# Patient Record
Sex: FEMALE | Race: WHITE | NOT HISPANIC OR LATINO | Employment: UNEMPLOYED | URBAN - METROPOLITAN AREA
[De-identification: names, ages, dates, MRNs, and addresses within clinical notes are randomized per-mention and may not be internally consistent; named-entity substitution may affect disease eponyms.]

---
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
## 2021-06-21 ENCOUNTER — TELEPHONE (OUTPATIENT)
Dept: OBGYN CLINIC | Facility: CLINIC | Age: 21
End: 2021-06-21

## 2021-06-21 DIAGNOSIS — Z30.41 ENCOUNTER FOR SURVEILLANCE OF CONTRACEPTIVE PILLS: ICD-10-CM

## 2021-06-21 RX ORDER — NORETHINDRONE ACETATE AND ETHINYL ESTRADIOL 1MG-20(21)
1 KIT ORAL DAILY
Qty: 84 TABLET | Refills: 0 | Status: SHIPPED | OUTPATIENT
Start: 2021-06-21 | End: 2021-09-29

## 2021-06-21 NOTE — TELEPHONE ENCOUNTER
norethindrone-ethinyl estradiol (JUNEL FE 1/20) 1-20 MG-MCG per tablet [[id number]]     Needs a refill

## 2021-09-29 DIAGNOSIS — Z30.41 ENCOUNTER FOR SURVEILLANCE OF CONTRACEPTIVE PILLS: ICD-10-CM

## 2021-09-29 RX ORDER — NORETHINDRONE ACETATE AND ETHINYL ESTRADIOL AND FERROUS FUMARATE 1MG-20(21)
KIT ORAL
Qty: 84 TABLET | Refills: 0 | Status: SHIPPED | OUTPATIENT
Start: 2021-09-29 | End: 2021-10-06 | Stop reason: SDUPTHER

## 2021-10-06 ENCOUNTER — ANNUAL EXAM (OUTPATIENT)
Dept: OBGYN CLINIC | Facility: CLINIC | Age: 21
End: 2021-10-06
Payer: COMMERCIAL

## 2021-10-06 VITALS
BODY MASS INDEX: 40.37 KG/M2 | HEIGHT: 66 IN | DIASTOLIC BLOOD PRESSURE: 82 MMHG | SYSTOLIC BLOOD PRESSURE: 128 MMHG | WEIGHT: 251.2 LBS

## 2021-10-06 DIAGNOSIS — Z30.41 ENCOUNTER FOR SURVEILLANCE OF CONTRACEPTIVE PILLS: ICD-10-CM

## 2021-10-06 DIAGNOSIS — Z01.419 WOMEN'S ANNUAL ROUTINE GYNECOLOGICAL EXAMINATION: Primary | ICD-10-CM

## 2021-10-06 PROCEDURE — 99395 PREV VISIT EST AGE 18-39: CPT | Performed by: OBSTETRICS & GYNECOLOGY

## 2021-10-06 RX ORDER — NORETHINDRONE ACETATE AND ETHINYL ESTRADIOL 1MG-20(21)
1 KIT ORAL DAILY
Qty: 84 TABLET | Refills: 3 | Status: SHIPPED | OUTPATIENT
Start: 2021-10-06

## 2021-12-13 ENCOUNTER — OFFICE VISIT (OUTPATIENT)
Dept: URGENT CARE | Age: 21
End: 2021-12-13
Payer: COMMERCIAL

## 2021-12-13 VITALS
HEIGHT: 66 IN | HEART RATE: 105 BPM | WEIGHT: 240 LBS | BODY MASS INDEX: 38.57 KG/M2 | TEMPERATURE: 96.6 F | OXYGEN SATURATION: 96 %

## 2021-12-13 DIAGNOSIS — R05.9 COUGH: Primary | ICD-10-CM

## 2021-12-13 DIAGNOSIS — B34.9 VIRAL SYNDROME: ICD-10-CM

## 2021-12-13 PROCEDURE — 99213 OFFICE O/P EST LOW 20 MIN: CPT

## 2021-12-13 PROCEDURE — 0241U HB NFCT DS VIR RESP RNA 4 TRGT: CPT

## 2022-02-16 ENCOUNTER — APPOINTMENT (EMERGENCY)
Dept: RADIOLOGY | Facility: HOSPITAL | Age: 22
End: 2022-02-16
Payer: COMMERCIAL

## 2022-02-16 ENCOUNTER — HOSPITAL ENCOUNTER (EMERGENCY)
Facility: HOSPITAL | Age: 22
Discharge: HOME | End: 2022-02-16
Attending: EMERGENCY MEDICINE
Payer: COMMERCIAL

## 2022-02-16 VITALS
DIASTOLIC BLOOD PRESSURE: 70 MMHG | RESPIRATION RATE: 18 BRPM | OXYGEN SATURATION: 100 % | TEMPERATURE: 98.1 F | HEIGHT: 67 IN | SYSTOLIC BLOOD PRESSURE: 110 MMHG | HEART RATE: 100 BPM | WEIGHT: 240 LBS | BODY MASS INDEX: 37.67 KG/M2

## 2022-02-16 DIAGNOSIS — R07.9 CHEST PAIN: Primary | ICD-10-CM

## 2022-02-16 LAB
ALBUMIN SERPL BCP-MCNC: 4 G/DL (ref 3.4–4.8)
ALP SERPL-CCNC: 87.3 U/L (ref 35–140)
ALT SERPL W P-5'-P-CCNC: 16 U/L (ref 5–54)
ANION GAP SERPL CALCULATED.3IONS-SCNC: 8 MMOL/L (ref 4–13)
AST SERPL W P-5'-P-CCNC: 15 U/L (ref 15–41)
ATRIAL RATE: 93 BPM
BASOPHILS # BLD AUTO: 0.08 THOUSANDS/ΜL (ref 0–0.1)
BASOPHILS NFR BLD AUTO: 1 % (ref 0–1)
BILIRUB SERPL-MCNC: 0.31 MG/DL (ref 0.3–1.2)
BUN SERPL-MCNC: 8 MG/DL (ref 6–20)
CALCIUM SERPL-MCNC: 9.1 MG/DL (ref 8.4–10.2)
CARDIAC TROPONIN I PNL SERPL HS: <2 NG/L
CARDIAC TROPONIN I PNL SERPL HS: <2 NG/L
CHLORIDE SERPL-SCNC: 105 MMOL/L (ref 96–108)
CO2 SERPL-SCNC: 26 MMOL/L (ref 22–33)
CREAT SERPL-MCNC: 0.87 MG/DL (ref 0.4–1.1)
D DIMER PPP FEU-MCNC: 0.31 UG/ML FEU
EOSINOPHIL # BLD AUTO: 0.08 THOUSAND/ΜL (ref 0–0.61)
EOSINOPHIL NFR BLD AUTO: 1 % (ref 0–6)
ERYTHROCYTE [DISTWIDTH] IN BLOOD BY AUTOMATED COUNT: 12 % (ref 11.6–15.1)
EXT PREG TEST URINE: NEGATIVE
EXT. CONTROL ED NAV: NORMAL
GFR SERPL CREATININE-BSD FRML MDRD: 96 ML/MIN/1.73SQ M
GLUCOSE SERPL-MCNC: 152 MG/DL (ref 65–140)
HCT VFR BLD AUTO: 42.6 % (ref 34.8–46.1)
HGB BLD-MCNC: 13.8 G/DL (ref 11.5–15.4)
IMM GRANULOCYTES # BLD AUTO: 0.02 THOUSAND/UL (ref 0–0.2)
IMM GRANULOCYTES NFR BLD AUTO: 0 % (ref 0–2)
LYMPHOCYTES # BLD AUTO: 4.32 THOUSANDS/ΜL (ref 0.6–4.47)
LYMPHOCYTES NFR BLD AUTO: 35 % (ref 14–44)
MCH RBC QN AUTO: 26.9 PG (ref 26.8–34.3)
MCHC RBC AUTO-ENTMCNC: 32.4 G/DL (ref 31.4–37.4)
MCV RBC AUTO: 83 FL (ref 82–98)
MONOCYTES # BLD AUTO: 0.61 THOUSAND/ΜL (ref 0.17–1.22)
MONOCYTES NFR BLD AUTO: 5 % (ref 4–12)
NEUTROPHILS # BLD AUTO: 7.29 THOUSANDS/ΜL (ref 1.85–7.62)
NEUTS SEG NFR BLD AUTO: 58 % (ref 43–75)
NRBC BLD AUTO-RTO: 0 /100 WBCS
P AXIS: 47 DEGREES
PLATELET # BLD AUTO: 326 THOUSANDS/UL (ref 149–390)
PMV BLD AUTO: 9 FL (ref 8.9–12.7)
POTASSIUM SERPL-SCNC: 3.6 MMOL/L (ref 3.5–5)
PR INTERVAL: 144 MS
PROT SERPL-MCNC: 7.6 G/DL (ref 6.4–8.3)
QRS AXIS: 44 DEGREES
QRSD INTERVAL: 89 MS
QT INTERVAL: 333 MS
QTC INTERVAL: 415 MS
RBC # BLD AUTO: 5.13 MILLION/UL (ref 3.81–5.12)
SODIUM SERPL-SCNC: 139 MMOL/L (ref 133–145)
T WAVE AXIS: 20 DEGREES
VENTRICULAR RATE: 93 BPM
WBC # BLD AUTO: 12.4 THOUSAND/UL (ref 4.31–10.16)

## 2022-02-16 PROCEDURE — 80053 COMPREHEN METABOLIC PANEL: CPT | Performed by: EMERGENCY MEDICINE

## 2022-02-16 PROCEDURE — 71046 X-RAY EXAM CHEST 2 VIEWS: CPT

## 2022-02-16 PROCEDURE — 99285 EMERGENCY DEPT VISIT HI MDM: CPT

## 2022-02-16 PROCEDURE — 93005 ELECTROCARDIOGRAM TRACING: CPT

## 2022-02-16 PROCEDURE — 85379 FIBRIN DEGRADATION QUANT: CPT | Performed by: EMERGENCY MEDICINE

## 2022-02-16 PROCEDURE — 99285 EMERGENCY DEPT VISIT HI MDM: CPT | Performed by: EMERGENCY MEDICINE

## 2022-02-16 PROCEDURE — 85025 COMPLETE CBC W/AUTO DIFF WBC: CPT | Performed by: EMERGENCY MEDICINE

## 2022-02-16 PROCEDURE — 36415 COLL VENOUS BLD VENIPUNCTURE: CPT | Performed by: EMERGENCY MEDICINE

## 2022-02-16 PROCEDURE — 81025 URINE PREGNANCY TEST: CPT | Performed by: EMERGENCY MEDICINE

## 2022-02-16 PROCEDURE — 84484 ASSAY OF TROPONIN QUANT: CPT | Performed by: EMERGENCY MEDICINE

## 2022-02-16 PROCEDURE — 93010 ELECTROCARDIOGRAM REPORT: CPT | Performed by: INTERNAL MEDICINE

## 2022-02-16 RX ORDER — ACETAMINOPHEN 325 MG/1
975 TABLET ORAL ONCE
Status: COMPLETED | OUTPATIENT
Start: 2022-02-16 | End: 2022-02-16

## 2022-02-16 RX ORDER — NAPROXEN 250 MG/1
250 TABLET ORAL ONCE
Status: COMPLETED | OUTPATIENT
Start: 2022-02-16 | End: 2022-02-16

## 2022-02-16 RX ADMIN — NAPROXEN 250 MG: 250 TABLET ORAL at 23:26

## 2022-02-16 RX ADMIN — ACETAMINOPHEN 975 MG: 325 TABLET, FILM COATED ORAL at 23:26

## 2022-02-16 NOTE — Clinical Note
Aleshia Gabby was seen and treated in our emergency department on 2/16/2022  No restrictions            Diagnosis: Private    Gab Beard  may return to work on return date  She may return on this date: 02/18/2022         If you have any questions or concerns, please don't hesitate to call        Lilli Cooley, DO    ______________________________           _______________          _______________  Hospital Representative                              Date                                Time

## 2022-02-17 NOTE — ED PROVIDER NOTES
History  Chief Complaint   Patient presents with    Chest Pain     started just prior to arrival; Pain shoots down right arm      81-year-old female, history of PCOS, presents with 30 minutes of chest pain described as substernal chest pain that radiates to her right arm and her back, denies any recent fever, cough, shortness of breath, headache, dizziness, nausea, vomiting, abdominal pain, dysuria, hematuria, constipation or diarrhea, or other complaints  Patient reports the chest pain feels like pressure-like pain was sharp stabbing exacerbations, worse with movement, not worse with inspiration  Prior to Admission Medications   Prescriptions Last Dose Informant Patient Reported? Taking? Cholecalciferol (VITAMIN D PO) Not Taking at Unknown time  Yes No   Sig: Take by mouth   Patient not taking: Reported on 12/13/2021    METFORMIN HCL PO Not Taking at Unknown time  Yes No   Sig: Take by mouth   Patient not taking: Reported on 10/6/2021   UNKNOWN TO PATIENT Not Taking at Unknown time  Yes No   Sig: Birth control   Patient not taking: Reported on 10/6/2021   norethindrone-ethinyl estradiol (Junel FE 1/20) 1-20 MG-MCG per tablet 2/16/2022 at Unknown time  No Yes   Sig: Take 1 tablet by mouth daily      Facility-Administered Medications: None       Past Medical History:   Diagnosis Date    Hirsutism     face, chest, abdomen, upper inner thigh    Low vitamin D level     Polycystic ovary syndrome        History reviewed  No pertinent surgical history  Family History   Problem Relation Age of Onset    Hypertension Mother     Hyperlipidemia Mother     Diabetes Father     Hypertension Father     Hyperlipidemia Father     Kidney failure Father         dyalysis    Kidney cancer Maternal Grandmother      I have reviewed and agree with the history as documented      E-Cigarette/Vaping    E-Cigarette Use Never User      E-Cigarette/Vaping Substances    Nicotine No     THC No     CBD No     Flavoring No     Other No     Unknown No      Social History     Tobacco Use    Smoking status: Never Smoker    Smokeless tobacco: Never Used   Vaping Use    Vaping Use: Never used   Substance Use Topics    Alcohol use: No    Drug use: No       Review of Systems   Constitutional: Negative for fever  HENT: Negative for congestion  Eyes: Negative for visual disturbance  Respiratory: Negative for cough and shortness of breath  Cardiovascular: Positive for chest pain  Gastrointestinal: Negative for constipation, diarrhea, nausea and vomiting  Endocrine: Negative for polyuria  Genitourinary: Negative for dysuria and hematuria  Musculoskeletal: Negative for myalgias  Neurological: Negative for dizziness and headaches  Physical Exam  Physical Exam  Vitals and nursing note reviewed  Constitutional:       Appearance: Normal appearance  HENT:      Head: Normocephalic and atraumatic  Right Ear: External ear normal       Left Ear: External ear normal       Mouth/Throat:      Mouth: Mucous membranes are moist       Pharynx: Oropharynx is clear  Eyes:      Extraocular Movements: Extraocular movements intact  Conjunctiva/sclera: Conjunctivae normal    Cardiovascular:      Rate and Rhythm: Normal rate and regular rhythm  Heart sounds: Normal heart sounds  Comments: Chest pain is not worse with palpation of the chest wall  Pulmonary:      Effort: Pulmonary effort is normal       Breath sounds: Normal breath sounds  Abdominal:      General: Abdomen is flat  There is no distension  Palpations: Abdomen is soft  Musculoskeletal:         General: No deformity  Normal range of motion  Cervical back: Normal range of motion  Skin:     General: Skin is warm and dry  Neurological:      General: No focal deficit present  Mental Status: She is alert        Gait: Gait normal    Psychiatric:         Mood and Affect: Mood normal          Behavior: Behavior normal          Vital Signs  ED Triage Vitals [02/16/22 1937]   Temperature Pulse Respirations Blood Pressure SpO2   98 1 °F (36 7 °C) (!) 106 18 147/89 99 %      Temp Source Heart Rate Source Patient Position - Orthostatic VS BP Location FiO2 (%)   Oral Monitor Sitting Left arm --      Pain Score       7           Vitals:    02/16/22 1937 02/16/22 2144 02/16/22 2238   BP: 147/89 127/62 110/70   Pulse: (!) 106 105 100   Patient Position - Orthostatic VS: Sitting Lying Lying         Visual Acuity      ED Medications  Medications   acetaminophen (TYLENOL) tablet 975 mg (975 mg Oral Given 2/16/22 2326)   naproxen (NAPROSYN) tablet 250 mg (250 mg Oral Given 2/16/22 2326)       Diagnostic Studies  Results Reviewed     Procedure Component Value Units Date/Time    HS Troponin I 2hr [[id number]] Collected: 02/16/22 2251    Lab Status: Final result Specimen: Blood from Arm, Right Updated: 02/16/22 2318     hs TnI 2hr <2 ng/L      Delta 2hr hsTnI --    POCT pregnancy, urine [[id number]]  (Normal) Resulted: 02/16/22 2143    Lab Status: Final result Updated: 02/16/22 2144     EXT PREG TEST UR (Ref: Negative) negative     Control valid    HS Troponin 0hr (reflex protocol) [[id number]]  (Normal) Collected: 02/16/22 2054    Lab Status: Final result Specimen: Blood from Arm, Left Updated: 02/16/22 2124     hs TnI 0hr <2 ng/L     Comprehensive metabolic panel [[id number]]  (Abnormal) Collected: 02/16/22 2054    Lab Status: Final result Specimen: Blood from Arm, Left Updated: 02/16/22 2120     Sodium 139 mmol/L      Potassium 3 6 mmol/L      Chloride 105 mmol/L      CO2 26 mmol/L      ANION GAP 8 mmol/L      BUN 8 mg/dL      Creatinine 0 87 mg/dL      Glucose 152 mg/dL      Calcium 9 1 mg/dL      AST 15 U/L      ALT 16 U/L      Alkaline Phosphatase 87 3 U/L      Total Protein 7 6 g/dL      Albumin 4 0 g/dL      Total Bilirubin 0 31 mg/dL      eGFR 96 ml/min/1 73sq m     Narrative:      [place] guidelines for Chronic Kidney Disease (CKD):   Stage 1 with normal or high GFR (GFR > 90 mL/min/1 73 square meters)    Stage 2 Mild CKD (GFR = 60-89 mL/min/1 73 square meters)    Stage 3A Moderate CKD (GFR = 45-59 mL/min/1 73 square meters)    Stage 3B Moderate CKD (GFR = 30-44 mL/min/1 73 square meters)    Stage 4 Severe CKD (GFR = 15-29 mL/min/1 73 square meters)    Stage 5 End Stage CKD (GFR <15 mL/min/1 73 square meters)  Note: GFR calculation is accurate only with a steady state creatinine    D-dimer, quantitative [[id number]]  (Normal) Collected: 02/16/22 2054    Lab Status: Final result Specimen: Blood from Arm, Left Updated: 02/16/22 2112     D-Dimer, Quant 0 31 ug/ml FEU     CBC and differential [[id number]]  (Abnormal) Collected: 02/16/22 2054    Lab Status: Final result Specimen: Blood from Arm, Left Updated: 02/16/22 2100     WBC 12 40 Thousand/uL      RBC 5 13 Million/uL      Hemoglobin 13 8 g/dL      Hematocrit 42 6 %      MCV 83 fL      MCH 26 9 pg      MCHC 32 4 g/dL      RDW 12 0 %      MPV 9 0 fL      Platelets 010 Thousands/uL      nRBC 0 /100 WBCs      Neutrophils Relative 58 %      Immat GRANS % 0 %      Lymphocytes Relative 35 %      Monocytes Relative 5 %      Eosinophils Relative 1 %      Basophils Relative 1 %      Neutrophils Absolute 7 29 Thousands/µL      Immature Grans Absolute 0 02 Thousand/uL      Lymphocytes Absolute 4 32 Thousands/µL      Monocytes Absolute 0 61 Thousand/µL      Eosinophils Absolute 0 08 Thousand/µL      Basophils Absolute 0 08 Thousands/µL                  XR chest 2 views   ED Interpretation by Bassam Gonzales DO (02/16 2149)   No acute cardiopulmonary process  Final Result by Hieu Toledo MD (02/17 2960)      No acute cardiopulmonary disease                    Workstation performed: [id number]                    Procedures  ECG 12 Lead Documentation Only    Date/Time: 2/16/2022 8:46 PM  Performed by: Maria D Monroy MD  Authorized by: Maria D Monroy MD     ECG reviewed by me, the ED Provider: yes    Patient location:  ED  Interpretation:     Interpretation: normal    Rate:     ECG rate:  93    ECG rate assessment: normal    Rhythm:     Rhythm: sinus rhythm    Ectopy:     Ectopy: none    QRS:     QRS axis:  Normal  Conduction:     Conduction: normal    ST segments:     ST segments:  Normal  T waves:     T waves: normal               ED Course         MDM  Number of Diagnoses or Management Options  Chest pain  Diagnosis management comments: Wells score is 4 5, perc is 2, patient will receive a dimer and will likely get a CTA chest PE study  Pt care transferred to Dr Edwar Martinez for further management, pending labs, imaging  Disposition  Final diagnoses:   Chest pain     Time reflects when diagnosis was documented in both MDM as applicable and the Disposition within this note     Time User Action Codes Description Comment    2/16/2022 11:19 PM Jules Ramirez Add [R07 9] Chest pain       ED Disposition     ED Disposition Condition Date/Time Comment    Discharge Stable Wed Feb 16, 2022 11:19 PM Jesika Yanes discharge to home/self care  Follow-up Information     Follow up With Specialties Details Why Contact Info Additional [id number]  Emergency Department Emergency Medicine  If symptoms worsen [address] [facility],[address] [id number]  [address] [facility] Emergency Department, [address], [address]          Discharge Medication List as of 2/16/2022 11:21 PM      CONTINUE these medications which have NOT CHANGED    Details   Cholecalciferol (VITAMIN D PO) Take by mouth, Historical Med      METFORMIN HCL PO Take by mouth, Historical Med      norethindrone-ethinyl estradiol (Junel FE 1/20) 1-20 MG-MCG per tablet Take 1 tablet by mouth daily, Starting Wed 10/6/2021, Normal      UNKNOWN TO PATIENT Birth control, Historical Med             No discharge procedures on file      PDMP Review None          ED Provider  Electronically Signed by           Qing Saldaña MD  02/17/22 9253

## 2022-02-17 NOTE — ED NOTES
Pt states she was sitting at home watching TV when she developed chest pain across chest with sharp pains down right arm  Pt states she feels short of breath from the pain        Denise Eli RN  02/16/22 0259

## 2022-02-17 NOTE — DISCHARGE INSTRUCTIONS
Follow-up with primary care doctor regarding chest pain today  Come back to emergency department if you have new or worsening symptoms such as increasing chest pain, increasing shortness of breath, coughing blood  Take Tylenol ibuprofen as needed for the pain  You may also try Gas-X to see if this decreases some of the discomfort

## 2022-02-17 NOTE — ED CARE HANDOFF
Emergency Department Sign Out Note        Sign out and transfer of care from Dr Nena Medina  See Separate Emergency Department note  The patient, Caryle Malay, was evaluated by the previous provider for CP with radiation to arm   Workup Completed:  ECG with no signs of ischemia  Sinus rhythm  Awaiting bloodwork and CXR  Possible CT PE scan if d dimer elevated  Delta torponin needed       ED Course / Workup Pending (followup): Patient with chest pain  On OCP   Tachycardia on initial eval  CP started 30 mins prior to arrival           HEART Risk Score      Most Recent Value   Heart Score Risk Calculator    History 0 Filed at: 02/16/2022 2319   ECG 0 Filed at: 02/16/2022 2319   Age 0 Filed at: 02/16/2022 2319   Risk Factors 1 Filed at: 02/16/2022 2319   Troponin 0 Filed at: 02/16/2022 2319   HEART Score 1 Filed at: 02/16/2022 2319                PERC Rule for PE      Most Recent Value   PERC Rule for PE    Age >=50 0 Filed at: 02/16/2022 2029   HR >=100 1 Filed at: 02/16/2022 2029   O2 Sat on room air < 95% 0 Filed at: 02/16/2022 2029   History of PE or DVT 0 Filed at: 02/16/2022 2029   Recent trauma or surgery 0 Filed at: 02/16/2022 2029   Hemoptysis 0 Filed at: 02/16/2022 2029   Exogenous estrogen 1 Filed at: 02/16/2022 2029   Unilateral leg swelling 0 Filed at: 02/16/2022 2029   PERC Rule for PE Results 2 Filed at: 02/16/2022 2029              Si Aston' Criteria for PE      Most Recent Value   Wells' Criteria for PE    Clinical signs and symptoms of DVT 0 Filed at: 02/16/2022 2028   PE is primary diagnosis or equally likely 3 Filed at: 02/16/2022 2028   HR >100 1 5 Filed at: 02/16/2022 2028   Immobilization at least 3 days or Surgery in the previous 4 weeks 0 Filed at: 02/16/2022 2028   Previous, objectively diagnosed PE or DVT 0 Filed at: 02/16/2022 2028   Hemoptysis 0 Filed at: 02/16/2022 2028   Malignancy with treatment within 6 months or palliative 0 Filed at: 02/16/2022 2028   Scott' Criteria Total 4 5 Filed at: 02/16/2022 2028                ED Course as of 02/17/22 0128   Wed Feb 16, 2022 2058 R CP radiates arm and back  On OCP  Tachycardia  2136 D-Dimer, Quant: 0 31   2258 Procedure Note: EKG  Date/Time: 02/16/22 10:58 PM   Interpreted by: Payton Hashimoto  Indications / Diagnosis: CP  ECG reviewed by me, the ED Provider: yes   The EKG demonstrates:  Rhythm: normal sinus  Intervals: normal intervals  Axis: normal axis  QRS/Blocks: normal QRS  ST Changes: No acute ST Changes, no STD/WILBER  Procedures  MDM  Number of Diagnoses or Management Options  Chest pain: new and requires workup  Diagnosis management comments: Patient presents with 30 minutes of chest pain  ECG without signs of ischemia  Delta ECG also without signs of ischemia  Patient mildly tachycardic  Perc positive  D-dimer is negative  Not PE  Patient reports multiple episodes of belching recently   Possible this is gastrointestinal in nature  Also evaluate for arrhythmia, electrolyte abnormalities, pneumonia, pneumothorax  Chest x-ray no significant findings per my read  Workup without significant findings  Patient to follow-up with PCP  Return precautions given         Amount and/or Complexity of Data Reviewed  Clinical lab tests: ordered and reviewed  Tests in the radiology section of CPT®: ordered and reviewed  Review and summarize past medical records: yes  Independent visualization of images, tracings, or specimens: yes    Risk of Complications, Morbidity, and/or Mortality  Presenting problems: moderate  Diagnostic procedures: moderate  Management options: moderate            Disposition  Final diagnoses:   Chest pain     Time reflects when diagnosis was documented in both MDM as applicable and the Disposition within this note     Time User Action Codes Description Comment    2/16/2022 11:19 PM Payton Hashimoto Add [R07 9] Chest pain       ED Disposition     ED Disposition Condition Date/Time Comment    Discharge Stable Wed Feb 16, 2022 11:19 PM Julieta Sol discharge to home/self care  Follow-up Information     Follow up With Specialties Details Why Contact Info Additional [id number]  Emergency Department Emergency Medicine  If symptoms worsen [address] [facility],[phone]  [address] [facility] Emergency Department, [address], [address]        Discharge Medication List as of 2/16/2022 11:21 PM      CONTINUE these medications which have NOT CHANGED    Details   Cholecalciferol (VITAMIN D PO) Take by mouth, Historical Med      METFORMIN HCL PO Take by mouth, Historical Med      norethindrone-ethinyl estradiol (Junel FE 1/20) 1-20 MG-MCG per tablet Take 1 tablet by mouth daily, Starting Wed 10/6/2021, Normal      UNKNOWN TO PATIENT Birth control, Historical Med           No discharge procedures on file         ED Provider  Electronically Signed by     Farhad Grover DO  02/17/22 9612

## 2022-07-20 ENCOUNTER — HOSPITAL ENCOUNTER (EMERGENCY)
Facility: HOSPITAL | Age: 22
Discharge: HOME | End: 2022-07-20
Attending: EMERGENCY MEDICINE | Admitting: EMERGENCY MEDICINE
Payer: COMMERCIAL

## 2022-07-20 VITALS
WEIGHT: 220 LBS | BODY MASS INDEX: 34.53 KG/M2 | SYSTOLIC BLOOD PRESSURE: 133 MMHG | TEMPERATURE: 98.3 F | HEART RATE: 109 BPM | RESPIRATION RATE: 18 BRPM | HEIGHT: 67 IN | OXYGEN SATURATION: 98 % | DIASTOLIC BLOOD PRESSURE: 88 MMHG

## 2022-07-20 DIAGNOSIS — R05.9 COUGH: Primary | ICD-10-CM

## 2022-07-20 PROCEDURE — 99283 EMERGENCY DEPT VISIT LOW MDM: CPT

## 2022-07-20 PROCEDURE — 99284 EMERGENCY DEPT VISIT MOD MDM: CPT | Performed by: EMERGENCY MEDICINE

## 2022-07-20 RX ORDER — BENZONATATE 100 MG/1
100 CAPSULE ORAL EVERY 8 HOURS
Qty: 21 CAPSULE | Refills: 0 | Status: SHIPPED | OUTPATIENT
Start: 2022-07-20

## 2022-07-20 NOTE — ED PROVIDER NOTES
History  Chief Complaint   Patient presents with    Cough     Patient here with c/o a dry cough x two weeks with no relief from OTC medication(Robitussin)  20-year-old female with previous history of polycystic ovarian syndrome  Presents emergency department dry cough x2 weeks  Has not been tested for COVID  Denies shortness of breath, fevers, nausea, vomiting  Notes occasional epigastric pain when she is coughing but otherwise has no pains associated with this  Cough is nonproductive  Has taken Robitussin without relief  Patient has no other complaints  Denies rhinorrhea, nasal congestion, sinus congestion, postnasal drip, allergies  Cough  Cough characteristics:  Non-productive  Sputum characteristics:  Nondescript  Severity:  Moderate  Onset quality:  Gradual  Timing:  Constant  Progression:  Worsening  Chronicity:  New  Relieved by:  Nothing  Worsened by:  Nothing  Ineffective treatments:  Decongestant      Prior to Admission Medications   Prescriptions Last Dose Informant Patient Reported? Taking? Cholecalciferol (VITAMIN D PO) Not Taking at Unknown time  Yes No   Sig: Take by mouth   Patient not taking: No sig reported   METFORMIN HCL PO Not Taking at Unknown time  Yes No   Sig: Take by mouth   Patient not taking: No sig reported   UNKNOWN TO PATIENT Not Taking at Unknown time  Yes No   Sig: Birth control   Patient not taking: No sig reported   norethindrone-ethinyl estradiol (Junel FE 1/20) 1-20 MG-MCG per tablet 7/20/2022 at Unknown time  No Yes   Sig: Take 1 tablet by mouth daily      Facility-Administered Medications: None       Past Medical History:   Diagnosis Date    Hirsutism     face, chest, abdomen, upper inner thigh    Low vitamin D level     Polycystic ovary syndrome        History reviewed  No pertinent surgical history      Family History   Problem Relation Age of Onset    Hypertension Mother     Hyperlipidemia Mother     Diabetes Father     Hypertension Father    Terrence Rosa Isela Hyperlipidemia Father     Kidney failure Father         dyalysis    Kidney cancer Maternal Grandmother      I have reviewed and agree with the history as documented  E-Cigarette/Vaping    E-Cigarette Use Never User      E-Cigarette/Vaping Substances    Nicotine No     THC No     CBD No     Flavoring No     Other No     Unknown No      Social History     Tobacco Use    Smoking status: Never Smoker    Smokeless tobacco: Never Used   Vaping Use    Vaping Use: Never used   Substance Use Topics    Alcohol use: No    Drug use: No       Review of Systems   Respiratory: Positive for cough  All other systems reviewed and are negative  Physical Exam  Physical Exam  Vitals and nursing note reviewed  Constitutional:       General: She is not in acute distress  Appearance: Normal appearance  She is not ill-appearing  HENT:      Head: Normocephalic and atraumatic  Right Ear: External ear normal       Left Ear: External ear normal       Nose: Nose normal       Mouth/Throat:      Mouth: Mucous membranes are moist       Pharynx: No oropharyngeal exudate or posterior oropharyngeal erythema  Comments: No postnasal drip  Eyes:      General:         Right eye: No discharge  Left eye: No discharge  Conjunctiva/sclera: Conjunctivae normal    Cardiovascular:      Rate and Rhythm: Normal rate and regular rhythm  Pulses: Normal pulses  Heart sounds: No murmur heard  Pulmonary:      Effort: Pulmonary effort is normal       Breath sounds: Normal breath sounds  Abdominal:      General: Abdomen is flat  There is no distension  Tenderness: There is no abdominal tenderness  Musculoskeletal:         General: Normal range of motion  Cervical back: Normal range of motion  Skin:     General: Skin is warm  Capillary Refill: Capillary refill takes less than 2 seconds  Findings: No rash  Neurological:      General: No focal deficit present        Mental Status: She is alert  Mental status is at baseline  Psychiatric:         Mood and Affect: Mood normal          Behavior: Behavior normal          Vital Signs  ED Triage Vitals [07/20/22 1406]   Temperature Pulse Respirations Blood Pressure SpO2   98 3 °F (36 8 °C) (!) 109 18 133/88 98 %      Temp Source Heart Rate Source Patient Position - Orthostatic VS BP Location FiO2 (%)   Oral Monitor Sitting Left arm --      Pain Score       No Pain           Vitals:    07/20/22 1406   BP: 133/88   Pulse: (!) 109   Patient Position - Orthostatic VS: Sitting         Visual Acuity      ED Medications  Medications - No data to display    Diagnostic Studies  Results Reviewed     None                 No orders to display              Procedures  Procedures         ED Course                               SBIRT 22yo+    Flowsheet Row Most Recent Value   SBIRT (23 yo +)    In order to provide better care to our patients, we are screening all of our patients for alcohol and drug use  Would it be okay to ask you these screening questions? Yes Filed at: 07/20/2022 1408   Initial Alcohol Screen: US AUDIT-C     1  How often do you have a drink containing alcohol? 0 Filed at: 07/20/2022 1408   2  How many drinks containing alcohol do you have on a typical day you are drinking? 0 Filed at: 07/20/2022 1408   3a  Male UNDER 65: How often do you have five or more drinks on one occasion? 0 Filed at: 07/20/2022 1408   3b  FEMALE Any Age, or MALE 65+: How often do you have 4 or more drinks on one occassion? 0 Filed at: 07/20/2022 1408   Audit-C Score 0 Filed at: 07/20/2022 1408   LEWIS: How many times in the past year have you    Used an illegal drug or used a prescription medication for non-medical reasons? Never Filed at: 07/20/2022 1408                    MDM  Number of Diagnoses or Management Options  Cough: new and requires workup  Diagnosis management comments: Patient with chronic cough  Will treat with Tessalon Perles    Recommend follow-up with PCP  No signs of pneumonia  No reason for imaging  Return precautions given  Patient declines COVID testing  Risk of Complications, Morbidity, and/or Mortality  Presenting problems: minimal  Diagnostic procedures: minimal  Management options: minimal    Patient Progress  Patient progress: stable      Disposition  Final diagnoses:   Cough     Time reflects when diagnosis was documented in both MDM as applicable and the Disposition within this note     Time User Action Codes Description Comment    7/20/2022  2:21 PM Jaden Schmitz Gerber [R05 9] Cough       ED Disposition     ED Disposition   Discharge    Condition   Stable    Date/Time   Wed Jul 20, 2022  2:21 PM    Comment   Leah Torres discharge to home/self care  Follow-up Information     Follow up With Specialties Details Why Contact Info Additional [address] Emergency Department Emergency Medicine  If symptoms worsen [address] [facility],[address] [id number]  [address] [place] [address] Emergency Department, [address], [address] [place] [address]    Memo Maradiaga MD Internal Medicine Schedule an appointment as soon as possible for a visit  For re-evaluation as soon as possible [address] [facility],[address]  [facility] [address]  [phone]             Patient's Medications   Discharge Prescriptions    BENZONATATE (TESSALON PERLES) 100 MG CAPSULE    Take 1 capsule (100 mg total) by mouth every 8 (eight) hours       Start Date: 7/20/2022 End Date: --       Order Dose: 100 mg       Quantity: 21 capsule    Refills: 0       No discharge procedures on file      PDMP Review     None          ED Provider  Electronically Signed by           Leah Oliva DO  07/20/22 1422

## 2022-07-20 NOTE — Clinical Note
Ahmet Sheehan was seen and treated in our emergency department on 7/20/2022  No restrictions            Diagnosis: Cough    Vince Kim  may return to work on return date  She may return on this date: 07/21/2022         If you have any questions or concerns, please don't hesitate to call        Stephie Marcelo DO    ______________________________           _______________          _______________  Hospital Representative                              Date                                Time

## 2022-07-20 NOTE — DISCHARGE INSTRUCTIONS
Follow-up with primary care provider soon as possible  If you do not have a primary care provider call Dr Viktoria Perez  Phone numbers below  Take the [employer] as prescribed  Use T honey as well for cough  Come back to emergency department if you have shortness of breath, fevers, new or worsening symptoms

## 2022-10-10 DIAGNOSIS — Z30.41 ENCOUNTER FOR SURVEILLANCE OF CONTRACEPTIVE PILLS: ICD-10-CM

## 2022-10-10 RX ORDER — NORETHINDRONE ACETATE AND ETHINYL ESTRADIOL 1MG-20(21)
1 KIT ORAL DAILY
Qty: 84 TABLET | Refills: 0 | Status: SHIPPED | OUTPATIENT
Start: 2022-10-10

## 2022-11-14 ENCOUNTER — HOSPITAL ENCOUNTER (EMERGENCY)
Facility: HOSPITAL | Age: 22
Discharge: HOME | End: 2022-11-14
Attending: EMERGENCY MEDICINE

## 2022-11-14 VITALS
DIASTOLIC BLOOD PRESSURE: 79 MMHG | HEIGHT: 66 IN | WEIGHT: 220 LBS | OXYGEN SATURATION: 96 % | TEMPERATURE: 98.4 F | HEART RATE: 100 BPM | SYSTOLIC BLOOD PRESSURE: 138 MMHG | BODY MASS INDEX: 35.36 KG/M2 | RESPIRATION RATE: 18 BRPM

## 2022-11-14 DIAGNOSIS — J06.9 VIRAL URI: Primary | ICD-10-CM

## 2022-11-14 RX ORDER — IBUPROFEN 600 MG/1
600 TABLET ORAL ONCE
Status: COMPLETED | OUTPATIENT
Start: 2022-11-14 | End: 2022-11-14

## 2022-11-14 RX ADMIN — IBUPROFEN 600 MG: 600 TABLET, FILM COATED ORAL at 09:36

## 2022-11-14 NOTE — Clinical Note
Nayla Bower was seen and treated in our emergency department on 11/14/2022  No restrictions            Diagnosis: Viral illness    Ana Nixon  may return to work on return date  She may return on this date: 11/15/2022    If flu or COVID testing is positive please follow CDC guidelines for returning to work  If you have any questions or concerns, please don't hesitate to call        Cata Baez MD    ______________________________           _______________          _______________  Hospital Representative                              Date                                Time

## 2022-11-14 NOTE — ED PROVIDER NOTES
HPI: Patient is a 25 y o  female who presents with 5 days of cough, headache and myalgias which the patient describes at mild The patient has had contact with people with similar symptoms  The patient taken OTC medication with relief of symptoms  No Known Allergies    Past Medical History:   Diagnosis Date   • Hirsutism     face, chest, abdomen, upper inner thigh   • Low vitamin D level    • Polycystic ovary syndrome       History reviewed  No pertinent surgical history  Social History     Tobacco Use   • Smoking status: Never   • Smokeless tobacco: Never   Vaping Use   • Vaping Use: Never used   Substance Use Topics   • Alcohol use: No   • Drug use: No       Nursing notes reviewed  Physical Exam:  ED Triage Vitals [11/14/22 0920]   Temperature Pulse Respirations Blood Pressure SpO2   98 4 °F (36 9 °C) 100 18 138/79 96 %      Temp Source Heart Rate Source Patient Position - Orthostatic VS BP Location FiO2 (%)   Oral Monitor Sitting Left arm --      Pain Score       8           ROS: Positive for as stated above in HPI, the remainder of a 10 organ system ROS was otherwise unremarkable  General: awake, alert, no acute distress    Head: normocephalic, atraumatic    Eyes: no scleral icterus  Ears: external ears normal, hearing grossly intact, TM normal bilaterally  Nose: external exam grossly normal, negative nasal discharge  Neck: symmetric, No JVD noted, trachea midline  Pulmonary: no respiratory distress, no tachypnea noted  Cardiovascular: appears well perfused  Abdomen: no distention noted  Musculoskeletal: no deformities noted, tone normal  Neuro: grossly non-focal  Psych: mood and affect appropriate    The patient is stable and has a history and physical exam consistent with a viral illness  COVID19 testing has been performed  I considered the patient's other medical conditions as applicable/noted above in my medical decision making  The patient is stable upon discharge   The plan is for supportive care at home     The patient (and any family present) verbalized understanding of the discharge instructions and warnings that would necessitate return to the Emergency Department  All questions were answered prior to discharge  Medications   ibuprofen (MOTRIN) tablet 600 mg (600 mg Oral Given 11/14/22 0936)     Final diagnoses:   Viral URI     Time reflects when diagnosis was documented in both MDM as applicable and the Disposition within this note     Time User Action Codes Description Comment    11/14/2022  9:30 AM Barbra Leon Add [J06 9] Viral URI       ED Disposition     ED Disposition   Discharge    Condition   Stable    Date/Time   Mon Nov 14, 2022  9:30 AM    Comment   Julieta Sol discharge to home/self care  Follow-up Information     Follow up With Specialties Details Why Contact Info Additional Information    Infolink  Call   [phone] [facility] Emergency Department Emergency Medicine Go to  If symptoms worsen [address] [facility],[address] [facility] Emergency Department, [address], [address] [place] [address] Rd        Discharge Medication List as of 11/14/2022  9:31 AM      CONTINUE these medications which have NOT CHANGED    Details   norethindrone-ethinyl estradiol (Junel FE 1/20) 1-20 MG-MCG per tablet Take 1 tablet by mouth daily, Starting Mon 10/10/2022, Normal           No discharge procedures on file      Electronically Signed by       Gloria Ramos MD  11/16/22 8209

## 2022-11-15 LAB
FLUAV RNA RESP QL NAA+PROBE: POSITIVE
FLUBV RNA RESP QL NAA+PROBE: NEGATIVE
SARS-COV-2 RNA RESP QL NAA+PROBE: NEGATIVE

## 2023-01-04 DIAGNOSIS — Z30.41 ENCOUNTER FOR SURVEILLANCE OF CONTRACEPTIVE PILLS: ICD-10-CM

## 2023-01-06 RX ORDER — NORETHINDRONE ACETATE AND ETHINYL ESTRADIOL AND FERROUS FUMARATE 1MG-20(21)
KIT ORAL
Qty: 84 TABLET | Refills: 0 | Status: SHIPPED | OUTPATIENT
Start: 2023-01-06

## 2023-03-30 ENCOUNTER — DOCUMENTATION (OUTPATIENT)
Dept: OBGYN CLINIC | Facility: CLINIC | Age: 23
End: 2023-03-30

## 2023-04-03 DIAGNOSIS — Z30.41 ENCOUNTER FOR SURVEILLANCE OF CONTRACEPTIVE PILLS: ICD-10-CM

## 2023-04-04 RX ORDER — NORETHINDRONE ACETATE AND ETHINYL ESTRADIOL AND FERROUS FUMARATE 1MG-20(21)
KIT ORAL
Qty: 84 TABLET | Refills: 0 | Status: SHIPPED | OUTPATIENT
Start: 2023-04-04

## 2024-09-18 ENCOUNTER — OFFICE VISIT (OUTPATIENT)
Dept: OBGYN CLINIC | Facility: CLINIC | Age: 24
End: 2024-09-18
Payer: COMMERCIAL

## 2024-09-18 VITALS
DIASTOLIC BLOOD PRESSURE: 62 MMHG | WEIGHT: 235.6 LBS | BODY MASS INDEX: 37.86 KG/M2 | HEIGHT: 66 IN | SYSTOLIC BLOOD PRESSURE: 118 MMHG

## 2024-09-18 DIAGNOSIS — Z01.419 WOMEN'S ANNUAL ROUTINE GYNECOLOGICAL EXAMINATION: Primary | ICD-10-CM

## 2024-09-18 PROCEDURE — S0612 ANNUAL GYNECOLOGICAL EXAMINA: HCPCS | Performed by: OBSTETRICS & GYNECOLOGY

## 2024-09-18 NOTE — PROGRESS NOTES
"Cheryl Bettencourt is a 24 y.o. female who presents for annual well woman exam. Periods are regular every 28-30 days, lasting 5 days. No intermenstrual bleeding, spotting, or discharge.    Current contraception: abstinence    Family history of uterine or ovarian cancer: no  Family history of breast cancer: yes - MGM    Menstrual History:  OB History          0    Para   0    Term   0       0    AB   0    Living   0         SAB   0    IAB   0    Ectopic   0    Multiple   0    Live Births   0                  Patient's last menstrual period was 2024 (exact date).  Period Cycle (Days): 28  Period Duration (Days): 5  Period Pattern: Regular  Menstrual Flow: Moderate  Dysmenorrhea: None    The following portions of the patient's history were reviewed and updated as appropriate: allergies, current medications, past family history, past medical history, past social history, past surgical history, and problem list.    Review of Systems  Review of Systems   Constitutional:  Negative for activity change, appetite change, chills, fatigue and fever.   Respiratory:  Negative for apnea, cough, chest tightness and shortness of breath.    Cardiovascular:  Negative for chest pain, palpitations and leg swelling.   Gastrointestinal:  Negative for abdominal pain, constipation, diarrhea, nausea and vomiting.   Genitourinary:  Negative for difficulty urinating, dysuria, flank pain, frequency, hematuria and urgency.   Neurological:  Negative for dizziness, seizures, syncope, light-headedness, numbness and headaches.   Psychiatric/Behavioral:  Negative for agitation and confusion.           Objective      /62 (BP Location: Left arm, Patient Position: Sitting, Cuff Size: Adult)   Ht 5' 6\" (1.676 m)   Wt 107 kg (235 lb 9.6 oz)   LMP 2024 (Exact Date)   BMI 38.03 kg/m²     Physical Exam  OBGyn Exam     General:   alert and oriented, in no acute distress, alert, appears stated age, and cooperative "   Heart: regular rate and rhythm, S1, S2 normal, no murmur, click, rub or gallop   Lungs: clear to auscultation bilaterally   Abdomen: soft, non-tender, without masses or organomegaly   Vulva: normal   Vagina:    Cervix:    Uterus:    Adnexa:  Breast Exam:    breasts appear normal, no suspicious masses, no skin or nipple changes or axillary nodes.         Patient never been sexually active preferred to defer pelvic exam       Assessment      @[email or web address]@ .  24-year-old female   annual exam  PCOS was on OCP stop last year regular cycle  Never been sexually active   Hirsutism stable since stop OCP  Plan  Patient preferred to hold off on Pap smear as she has never been sexually active  Diet/exercise  Calcium/vitamin D  Continue monitoring menstrual cycle  Return to office for annual exam earlier if she start sexual activity to discuss contraception  Condom with all sexual activity     All questions answered.     There are no Patient Instructions on file for this visit.